# Patient Record
Sex: FEMALE | Race: WHITE | NOT HISPANIC OR LATINO | Employment: FULL TIME | URBAN - METROPOLITAN AREA
[De-identification: names, ages, dates, MRNs, and addresses within clinical notes are randomized per-mention and may not be internally consistent; named-entity substitution may affect disease eponyms.]

---

## 2017-01-17 ENCOUNTER — OFFICE VISIT (OUTPATIENT)
Dept: OBSTETRICS AND GYNECOLOGY | Facility: CLINIC | Age: 37
End: 2017-01-17

## 2017-01-17 VITALS
DIASTOLIC BLOOD PRESSURE: 64 MMHG | WEIGHT: 137.6 LBS | BODY MASS INDEX: 24.38 KG/M2 | HEIGHT: 63 IN | SYSTOLIC BLOOD PRESSURE: 110 MMHG

## 2017-01-17 DIAGNOSIS — Z01.419 GYNECOLOGIC EXAM NORMAL: Primary | ICD-10-CM

## 2017-01-17 PROCEDURE — 99395 PREV VISIT EST AGE 18-39: CPT | Performed by: NURSE PRACTITIONER

## 2017-01-17 RX ORDER — NORETHINDRONE ACETATE AND ETHINYL ESTRADIOL, ETHINYL ESTRADIOL AND FERROUS FUMARATE 1MG-10(24)
KIT ORAL
COMMUNITY
Start: 2016-12-30 | End: 2019-08-19

## 2017-01-17 NOTE — PATIENT INSTRUCTIONS
Pt. Counseled today on safe sex practices, pap smear performed, self breast examinations discussed, if positive family history mammogram starting at age 35 otherwise starting at age 40. Diet and exercise also counseled. Discussed  Spotting and stopping the pill for 1 month

## 2017-01-17 NOTE — PROGRESS NOTES
Jesusita Harris is a 36 y.o. female. Here for annual exam  Chief Complaint   Patient presents with   • Gynecologic Exam     annual, spotting      HPI:states she has been spotting on lo/loestrin for past several months    The following portions of the patient's history were reviewed and updated as appropriate: allergies, current medications, past family history, past medical history, past social history, past surgical history and problem list.    Review of Systems  Review of Systems   Constitutional: Negative.  Negative for unexpected weight change.   Respiratory: Negative for chest tightness and shortness of breath.    Cardiovascular: Negative for chest pain and palpitations.   Gastrointestinal: Negative for abdominal pain and blood in stool.   Endocrine: Negative.    Genitourinary: Positive for vaginal bleeding. Negative for dyspareunia, dysuria, frequency, hematuria, menstrual problem, pelvic pain, vaginal discharge and vaginal pain.   Musculoskeletal: Negative for joint swelling.   Skin: Negative for color change, rash and wound.   Allergic/Immunologic: Negative.    Psychiatric/Behavioral: Negative.    All other systems reviewed and are negative.      Objective   Physical Exam   Constitutional: She is oriented to person, place, and time. She appears well-developed and well-nourished.   HENT:   Head: Normocephalic.   Neck: Normal range of motion.   Cardiovascular: Normal rate and regular rhythm.    Pulmonary/Chest: Effort normal and breath sounds normal. Right breast exhibits no mass and no nipple discharge. Left breast exhibits no mass and no nipple discharge. There is no breast swelling.   Breasts soft without palpable masses   Abdominal: Soft. Bowel sounds are normal.   Genitourinary: Vagina normal and uterus normal. There is no rash or lesion on the right labia. There is no rash or lesion on the left labia. Cervix exhibits no friability. Right adnexum displays no mass, no tenderness and no fullness. Left  adnexum displays no mass, no tenderness and no fullness.   Genitourinary Comments: Ovaries  Within normal limits. Cervix  Within normal limits   Neurological: She is alert and oriented to person, place, and time.   Skin: Skin is warm and dry.   Psychiatric: She has a normal mood and affect. Her behavior is normal.   Vitals reviewed.      Assessment/Plan   Patient Instructions   Pt. Counseled today on safe sex practices, pap smear performed, self breast examinations discussed, if positive family history mammogram starting at age 35 otherwise starting at age 40. Diet and exercise also counseled. Discussed  Spotting and stopping the pill for 1 month      Jesusita was seen today for gynecologic exam.    Diagnoses and all orders for this visit:    Gynecologic exam normal  -     Pap IG, Rfx HPV ASCU        Return in about 1 year (around 1/17/2018).

## 2017-01-17 NOTE — MR AVS SNAPSHOT
"                        Jesusita Harris   1/17/2017 8:45 AM   Office Visit    Dept Phone:  [phone]   Encounter #:  [id number]    Provider:  NAYA Quintanilla   Department:  [facility]                Your Full Care Plan              Your Updated Medication List          This list is accurate as of: 1/17/17  9:38 AM.  Always use your most recent med list.                LO LOESTRIN FE 1 MG-10 MCG / 10 MCG tablet   Generic drug:  Norethin-Eth Estrad-Fe Biphas               We Performed the Following     Pap IG, Rfx HPV ASCU       You Were Diagnosed With        Codes Comments    Gynecologic exam normal    -  Primary ICD-10-CM: Z01.419  ICD-9-CM: V72.31       Instructions    Pt. Counseled today on safe sex practices, pap smear performed, self breast examinations discussed, if positive family history mammogram starting at age 35 otherwise starting at age 40. Diet and exercise also counseled. Discussed  Spotting and stopping the pill for 1 month     Patient Instructions History      Upcoming Appointments     Visit Type Date Time Department    WELLNESS 1/17/2017  8:45 AM STACY VASQUEZ [place]      [employer] Signup     Our records indicate that you have declined [employer] signup. If you would like to sign up for [employer], please email [email or web address] or call [phone] to obtain an activation code.             Other Info from Your Visit           Allergies     Penicillins        Reason for Visit     Gynecologic Exam annual, spotting      Vital Signs     Blood Pressure Height Weight Breastfeeding? Body Mass Index Smoking Status    110/64 63\" (160 cm) 137 lb 9.6 oz (62.4 kg) No 24.37 kg/m2 Never Smoker      Problems and Diagnoses Noted     Normal pelvic exam    -  Primary        "

## 2017-01-19 LAB
CONV .: NORMAL
CYTOLOGIST CVX/VAG CYTO: NORMAL
CYTOLOGY CVX/VAG DOC THIN PREP: NORMAL
DX ICD CODE: NORMAL
HIV 1 & 2 AB SER-IMP: NORMAL
OTHER STN SPEC: NORMAL
PATH REPORT.FINAL DX SPEC: NORMAL
STAT OF ADQ CVX/VAG CYTO-IMP: NORMAL

## 2018-02-21 ENCOUNTER — OFFICE VISIT (OUTPATIENT)
Dept: OBSTETRICS AND GYNECOLOGY | Facility: CLINIC | Age: 38
End: 2018-02-21

## 2018-02-21 VITALS
DIASTOLIC BLOOD PRESSURE: 62 MMHG | SYSTOLIC BLOOD PRESSURE: 114 MMHG | HEIGHT: 55 IN | WEIGHT: 134 LBS | BODY MASS INDEX: 31.01 KG/M2

## 2018-02-21 DIAGNOSIS — Z01.419 GYNECOLOGIC EXAM NORMAL: Primary | ICD-10-CM

## 2018-02-21 PROCEDURE — 99395 PREV VISIT EST AGE 18-39: CPT | Performed by: NURSE PRACTITIONER

## 2018-02-21 RX ORDER — DROSPIRENONE AND ETHINYL ESTRADIOL 0.02-3(28)
1 KIT ORAL DAILY
Qty: 28 TABLET | Refills: 1 | Status: SHIPPED | OUTPATIENT
Start: 2018-02-21 | End: 2018-10-05 | Stop reason: SDUPTHER

## 2018-02-21 NOTE — PROGRESS NOTES
Jesusita Harris is a 37 y.o. female.   Chief Complaint   Patient presents with   • Gynecologic Exam     Patient is here for a annual.      HPI:pt here for gyn exam, c/o acne    The following portions of the patient's history were reviewed and updated as appropriate: allergies, current medications, past family history, past medical history, past social history, past surgical history and problem list.    Review of Systems  Review of Systems   Constitutional: Negative.  Negative for unexpected weight change.   Respiratory: Negative for chest tightness and shortness of breath.    Cardiovascular: Negative for chest pain and palpitations.   Gastrointestinal: Negative for abdominal pain and blood in stool.   Endocrine: Negative.    Genitourinary: Negative for dyspareunia, dysuria, frequency, hematuria, menstrual problem, pelvic pain, vaginal bleeding, vaginal discharge and vaginal pain.   Musculoskeletal: Negative for joint swelling.   Skin: Negative for color change, rash and wound.   Allergic/Immunologic: Negative.    Psychiatric/Behavioral: Negative.    All other systems reviewed and are negative.      Objective   Physical Exam   Constitutional: She is oriented to person, place, and time. She appears well-developed and well-nourished.   HENT:   Head: Normocephalic.   Neck: Normal range of motion.   Cardiovascular: Normal rate and regular rhythm.    Pulmonary/Chest: Effort normal and breath sounds normal. Right breast exhibits no mass and no nipple discharge. Left breast exhibits no mass and no nipple discharge. There is no breast swelling.   Breasts soft without palpable masses breast are dense   Abdominal: Soft. Bowel sounds are normal.   Genitourinary: Vagina normal and uterus normal. There is no rash or lesion on the right labia. There is no rash or lesion on the left labia. Cervix exhibits no friability. Right adnexum displays no mass, no tenderness and no fullness. Left adnexum displays no mass, no tenderness and  no fullness.   Genitourinary Comments: Ovaries  Within normal limits. Cervix  Within normal limits   Neurological: She is alert and oriented to person, place, and time.   Skin: Skin is warm and dry.   Psychiatric: She has a normal mood and affect. Her behavior is normal.   Vitals reviewed.      Assessment/Plan   Patient Instructions   Pt. Counseled today on safe sex practices, pap smear performed, self breast examinations discussed, if positive family history mammogram starting at age 35 otherwise starting at age 40. Diet and exercise also counseled.       Jesusita was seen today for gynecologic exam.    Diagnoses and all orders for this visit:    Gynecologic exam normal  -     Pap IG, Rfx HPV ASCU    Other orders  -     drospirenone-ethinyl estradiol (MIRANDA) 3-0.02 MG per tablet; Take 1 tablet by mouth Daily.        Return in about 1 year (around 2/21/2019).

## 2018-10-05 RX ORDER — DROSPIRENONE AND ETHINYL ESTRADIOL TABLETS 0.02-3(28)
1 KIT ORAL DAILY
Qty: 28 TABLET | Refills: 0 | Status: SHIPPED | OUTPATIENT
Start: 2018-10-05 | End: 2018-11-01 | Stop reason: SDUPTHER

## 2018-11-05 RX ORDER — DROSPIRENONE AND ETHINYL ESTRADIOL TABLETS 0.02-3(28)
1 KIT ORAL DAILY
Qty: 28 TABLET | Refills: 0 | Status: SHIPPED | OUTPATIENT
Start: 2018-11-05 | End: 2019-08-19

## 2019-07-23 ENCOUNTER — OFFICE VISIT (OUTPATIENT)
Dept: FAMILY MEDICINE CLINIC | Facility: CLINIC | Age: 39
End: 2019-07-23

## 2019-07-23 VITALS
TEMPERATURE: 98.6 F | HEIGHT: 55 IN | SYSTOLIC BLOOD PRESSURE: 120 MMHG | RESPIRATION RATE: 16 BRPM | WEIGHT: 131 LBS | HEART RATE: 55 BPM | BODY MASS INDEX: 30.32 KG/M2 | OXYGEN SATURATION: 100 % | DIASTOLIC BLOOD PRESSURE: 80 MMHG

## 2019-07-23 DIAGNOSIS — Z00.00 WELLNESS EXAMINATION: Primary | ICD-10-CM

## 2019-07-23 PROCEDURE — 99395 PREV VISIT EST AGE 18-39: CPT | Performed by: NURSE PRACTITIONER

## 2019-07-23 NOTE — PROGRESS NOTES
Subjective   Jesusita Harris is a 39 y.o. female.     History of Present Illness   Jesusita Harris 39 y.o. female who presents today for a new patient appointment.    she has a history of There is no problem list on file for this patient.  .  she is here for a wellness check up and is here to re-establish care I reviewed the PFSH recorded today by my MA/LPN staff.   she   She has been feeling well.      Reports regular exercise of 4-5 days per week with cardio and weight training.    Reports hydrating well.      Reports regular menstrual periods. LMP was last week.  Reports lighter flow than she used to have.     Reports being UTD on dental exam. She has appt every 6 months.  Reports eye exam 2 years ago.     The following portions of the patient's history were reviewed and updated as appropriate: allergies, current medications, past family history, past medical history, past social history, past surgical history and problem list.    Review of Systems   Constitutional: Negative for activity change, appetite change, fatigue and unexpected weight change.   HENT: Negative for congestion.    Respiratory: Negative for shortness of breath.    Cardiovascular: Negative for chest pain and palpitations.   Gastrointestinal: Negative for abdominal pain and constipation.   Endocrine: Negative for cold intolerance, heat intolerance, polydipsia, polyphagia and polyuria.   Genitourinary: Negative for difficulty urinating, dysuria and menstrual problem.   Musculoskeletal: Negative for arthralgias.   Skin: Negative for rash.   Allergic/Immunologic: Negative for immunocompromised state.   Neurological: Negative for headaches.   Hematological: Negative for adenopathy. Does not bruise/bleed easily.   Psychiatric/Behavioral: Negative for behavioral problems, dysphoric mood and sleep disturbance. The patient is not nervous/anxious.        Objective   Physical Exam   Constitutional: She is oriented to person, place, and time. She appears  well-developed and well-nourished.   Neck: Carotid bruit is not present. No thyromegaly present.   Cardiovascular: Normal rate and regular rhythm.   Pulmonary/Chest: Effort normal and breath sounds normal.   Neurological: She is alert and oriented to person, place, and time.   Psychiatric: She has a normal mood and affect. Judgment normal.   Nursing note and vitals reviewed.      Assessment/Plan   Jesusita was seen today for annual exam.    Diagnoses and all orders for this visit:    Wellness examination  -     Comprehensive metabolic panel  -     Lipid panel  -     CBC and Differential  -     TSH

## 2019-08-13 ENCOUNTER — TELEPHONE (OUTPATIENT)
Dept: FAMILY MEDICINE CLINIC | Facility: CLINIC | Age: 39
End: 2019-08-13

## 2019-08-19 ENCOUNTER — OFFICE VISIT (OUTPATIENT)
Dept: FAMILY MEDICINE CLINIC | Facility: CLINIC | Age: 39
End: 2019-08-19

## 2019-08-19 VITALS
BODY MASS INDEX: 23.21 KG/M2 | HEIGHT: 63 IN | SYSTOLIC BLOOD PRESSURE: 140 MMHG | WEIGHT: 131 LBS | HEART RATE: 66 BPM | RESPIRATION RATE: 16 BRPM | TEMPERATURE: 97.9 F | DIASTOLIC BLOOD PRESSURE: 82 MMHG | OXYGEN SATURATION: 100 %

## 2019-08-19 DIAGNOSIS — Z12.4 PAP SMEAR FOR CERVICAL CANCER SCREENING: Primary | ICD-10-CM

## 2019-08-19 PROCEDURE — 99395 PREV VISIT EST AGE 18-39: CPT | Performed by: NURSE PRACTITIONER

## 2019-08-19 NOTE — PROGRESS NOTES
Subjective   Jesusita Harris is a 39 y.o. female.     History of Present Illness   Jesusita Harris 39 y.o. female  who presents for their yearly GYN exam. Periods are regular every 28-30 days, lasting a few days. Dysmenorrhea:mild, occurring premenstrually. Cyclic symptoms include cramping. No intermenstrual bleeding, spotting, or discharge.  She reports not having additional complaints.    Current contraception:  no method at present time  History of abnormal Pap smear: yes - 15 years ago  Family history of uterine or ovarian cancer: no  Family history of colon cancer: no  History of abnormal mammogram: no  Family history of breast cancer: no    Patient declines STD screening.  The following portions of the patient's history were reviewed and updated as appropriate: allergies, current medications, past family history, past medical history, past social history, past surgical history and problem list.    Review of Systems   Constitutional: Negative for unexpected weight change.   Respiratory: Negative for shortness of breath.    Cardiovascular: Negative for chest pain and palpitations.   Gastrointestinal: Negative for abdominal pain.   Genitourinary: Negative for decreased urine volume, difficulty urinating, dyspareunia, dysuria, enuresis, flank pain, frequency, genital sores, hematuria, menstrual problem, pelvic pain, urgency, vaginal bleeding, vaginal discharge and vaginal pain.   Musculoskeletal: Negative for back pain.   Psychiatric/Behavioral: Negative for behavioral problems.       Objective   Physical Exam   Constitutional: She is oriented to person, place, and time. She appears well-developed and well-nourished.   HENT:   Head: Normocephalic and atraumatic.   Pulmonary/Chest: Effort normal. Right breast exhibits no inverted nipple, no mass, no nipple discharge, no skin change and no tenderness. Left breast exhibits no inverted nipple, no mass, no nipple discharge, no skin change and no tenderness. Breasts are  symmetrical. There is no breast swelling.   Genitourinary: Vagina normal and uterus normal. No breast tenderness, discharge or bleeding. Pelvic exam was performed with patient supine. There is no rash, tenderness, lesion or injury on the right labia. There is no rash, tenderness, lesion or injury on the left labia. Cervix exhibits no motion tenderness, no discharge and no friability. Right adnexum displays no mass, no tenderness and no fullness. Left adnexum displays no mass, no tenderness and no fullness.   Neurological: She is alert and oriented to person, place, and time.   Skin: Skin is warm and dry.   Psychiatric: She has a normal mood and affect. Judgment normal.   Nursing note and vitals reviewed.      Assessment/Plan   Jesusita was seen today for gynecologic exam.    Diagnoses and all orders for this visit:    Pap smear for cervical cancer screening  -     PapIG, HPV, Rfx 16 / 18

## 2019-08-21 ENCOUNTER — OFFICE VISIT (OUTPATIENT)
Dept: FAMILY MEDICINE CLINIC | Facility: CLINIC | Age: 39
End: 2019-08-21

## 2019-08-21 VITALS
HEIGHT: 63 IN | SYSTOLIC BLOOD PRESSURE: 110 MMHG | HEART RATE: 66 BPM | DIASTOLIC BLOOD PRESSURE: 68 MMHG | OXYGEN SATURATION: 99 % | WEIGHT: 131 LBS | BODY MASS INDEX: 23.21 KG/M2 | RESPIRATION RATE: 16 BRPM

## 2019-08-21 DIAGNOSIS — G56.02 CARPAL TUNNEL SYNDROME, LEFT: ICD-10-CM

## 2019-08-21 DIAGNOSIS — S76.311A STRAIN OF RIGHT HAMSTRING, INITIAL ENCOUNTER: Primary | ICD-10-CM

## 2019-08-21 DIAGNOSIS — R21 SKIN RASH: ICD-10-CM

## 2019-08-21 PROCEDURE — 99214 OFFICE O/P EST MOD 30 MIN: CPT | Performed by: NURSE PRACTITIONER

## 2019-08-21 RX ORDER — CLOTRIMAZOLE AND BETAMETHASONE DIPROPIONATE 10; .64 MG/G; MG/G
CREAM TOPICAL 2 TIMES DAILY
Qty: 45 G | Refills: 0 | Status: SHIPPED | OUTPATIENT
Start: 2019-08-21 | End: 2020-11-10

## 2019-08-21 RX ORDER — METHYLPREDNISOLONE 4 MG/1
TABLET ORAL
Qty: 21 TABLET | Refills: 0 | Status: SHIPPED | OUTPATIENT
Start: 2019-08-21 | End: 2020-01-16

## 2019-08-21 RX ORDER — NAPROXEN 500 MG/1
500 TABLET ORAL 2 TIMES DAILY WITH MEALS
Qty: 30 TABLET | Refills: 0 | Status: SHIPPED | OUTPATIENT
Start: 2019-08-21 | End: 2020-01-16

## 2019-08-21 NOTE — PROGRESS NOTES
"Subjective   Jesusita Harris is a 39 y.o. female.     History of Present Illness   Patient complains of right knee pain. The symptoms began 6 weeks ago.  Reports she heard a \"pop\" when playing volleyball 6 weeks ago. Pain is located lateral and posterior region. Discomfort is described as sharp. Symptoms are exacerbated by extension.  Evaluation to date: none. Therapy to date includes: rest and ice.  Patient states she has been continuing to exercise.  Pain has improved but is not resolved. Denies swelling.    Also reports left hand pain for several days that starts in her palm and radiates to 1st through 4th fingers.  Reports aching pain. Denies weakness.       Reports small rash to right sacral area for weeks.  Has not been applying topical treatment.  Denies pain or itching.  Has hx of eczema.         The following portions of the patient's history were reviewed and updated as appropriate: allergies, current medications, past family history, past medical history, past social history, past surgical history and problem list.    Review of Systems   Constitutional: Negative for chills and fever.   Musculoskeletal: Positive for arthralgias. Negative for back pain, gait problem and joint swelling.   Skin: Positive for rash.   Neurological: Negative for weakness and numbness.       Objective   Physical Exam   Constitutional: She is oriented to person, place, and time. She appears well-developed and well-nourished.   Pulmonary/Chest: Effort normal.   Musculoskeletal:        Right knee: She exhibits normal range of motion, no swelling, no effusion, no ecchymosis, no deformity, no laceration, no erythema, normal alignment, no LCL laxity, normal patellar mobility, no bony tenderness, normal meniscus and no MCL laxity. No tenderness found. No medial joint line, no lateral joint line, no MCL, no LCL and no patellar tendon tenderness noted.   Joint stable. No pain elicited with ROM or palpation    Neurological: She is alert and " oriented to person, place, and time.   Skin: There is erythema.        1 cm erythematous scaly patch to right sacral area.    Psychiatric: She has a normal mood and affect. Judgment normal.   Nursing note and vitals reviewed.      Assessment/Plan   Jesusita was seen today for knee pain.    Diagnoses and all orders for this visit:    Strain of right hamstring, initial encounter  -     methylPREDNISolone (MEDROL, CHERRY,) 4 MG tablet; Take as directed on package instructions.  -     naproxen (NAPROSYN) 500 MG tablet; Take 1 tablet by mouth 2 (Two) Times a Day With Meals.    Carpal tunnel syndrome, left  -     methylPREDNISolone (MEDROL, CHERRY,) 4 MG tablet; Take as directed on package instructions.  -     naproxen (NAPROSYN) 500 MG tablet; Take 1 tablet by mouth 2 (Two) Times a Day With Meals.    Skin rash  -     clotrimazole-betamethasone (LOTRISONE) 1-0.05 % cream; Apply  topically to the appropriate area as directed 2 (Two) Times a Day.

## 2019-08-22 LAB
CYTOLOGIST CVX/VAG CYTO: NORMAL
CYTOLOGY CVX/VAG DOC CYTO: NORMAL
CYTOLOGY CVX/VAG DOC THIN PREP: NORMAL
DX ICD CODE: NORMAL
HIV 1 & 2 AB SER-IMP: NORMAL
HPV I/H RISK 1 DNA CVX QL PROBE+SIG AMP: NEGATIVE
Lab: NORMAL
OTHER STN SPEC: NORMAL
STAT OF ADQ CVX/VAG CYTO-IMP: NORMAL

## 2020-01-16 ENCOUNTER — OFFICE VISIT (OUTPATIENT)
Dept: FAMILY MEDICINE CLINIC | Facility: CLINIC | Age: 40
End: 2020-01-16

## 2020-01-16 VITALS
HEIGHT: 63 IN | SYSTOLIC BLOOD PRESSURE: 100 MMHG | BODY MASS INDEX: 24.1 KG/M2 | WEIGHT: 136 LBS | DIASTOLIC BLOOD PRESSURE: 60 MMHG | RESPIRATION RATE: 16 BRPM | HEART RATE: 64 BPM | OXYGEN SATURATION: 97 %

## 2020-01-16 DIAGNOSIS — Z30.011 INITIATION OF OCP (BCP): Primary | ICD-10-CM

## 2020-01-16 DIAGNOSIS — Z12.31 ENCOUNTER FOR SCREENING MAMMOGRAM FOR BREAST CANCER: ICD-10-CM

## 2020-01-16 PROCEDURE — 99213 OFFICE O/P EST LOW 20 MIN: CPT | Performed by: NURSE PRACTITIONER

## 2020-01-16 RX ORDER — LEVONORGESTREL AND ETHINYL ESTRADIOL 0.15-0.03
1 KIT ORAL DAILY
Qty: 84 TABLET | Refills: 3 | Status: SHIPPED | OUTPATIENT
Start: 2020-01-16 | End: 2020-01-16

## 2020-01-16 RX ORDER — LEVONORGESTREL / ETHINYL ESTRADIOL AND ETHINYL ESTRADIOL 150-30(84)
1 KIT ORAL DAILY
Qty: 84 EACH | Refills: 3 | Status: SHIPPED | OUTPATIENT
Start: 2020-01-16 | End: 2020-11-10

## 2020-01-16 NOTE — PATIENT INSTRUCTIONS
Oral Contraception Use  Oral contraceptive pills (OCPs) are medicines that you take to prevent pregnancy. OCPs work by:  · Preventing the ovaries from releasing eggs.  · Thickening mucus in the lower part of the uterus (cervix), which prevents sperm from entering the uterus.  · Thinning the lining of the uterus (endometrium), which prevents a fertilized egg from attaching to the endometrium.  OCPs are highly effective when taken exactly as prescribed. However, OCPs do not prevent sexually transmitted infections (STIs). Safe sex practices, such as using condoms while on an OCP, can help prevent STIs.  Before taking OCPs, you may have a physical exam, blood test, and Pap test. A Pap test involves taking a sample of cells from your cervix to check for cancer. Discuss with your health care provider the possible side effects of the OCP you may be prescribed. When you start an OCP, be aware that it can take 2-3 months for your body to adjust to changes in hormone levels.  How to take oral contraceptive pills  Follow instructions from your health care provider about how to start taking your first cycle of OCPs. Your health care provider may recommend that you:  · Start the pill on day 1 of your menstrual period. If you start at this time, you will not need any backup form of birth control (contraception), such as condoms.  · Start the pill on the first Sunday after your menstrual period or on the day you get your prescription. In these cases, you will need to use backup contraception for the first week.  · Start the pill at any time of your cycle.  ? If you take the pill within 5 days of the start of your period, you will not need a backup form of contraception.  ? If you start at any other time of your menstrual cycle, you will need to use another form of contraception for 7 days. If your OCP is the type called a minipill, it will protect you from pregnancy after taking it for 2 days (48 hours), and you can stop using  backup contraception after that time.  After you have started taking OCPs:  · If you forget to take 1 pill, take it as soon as you remember. Take the next pill at the regular time.  · If you miss 2 or more pills, call your health care provider. Different pills have different instructions for missed doses. Use backup birth control until your next menstrual period starts.  · If you use a 28-day pack that contains inactive pills and you miss 1 of the last 7 pills (pills with no hormones), throw away the rest of the non-hormone pills and start a new pill pack.  No matter which day you start the OCP, you will always start a new pack on that same day of the week. Have an extra pack of OCPs and a backup contraceptive method available in case you miss some pills or lose your OCP pack.  Follow these instructions at home:  · Do not use any products that contain nicotine or tobacco, such as cigarettes and e-cigarettes. If you need help quitting, ask your health care provider.  · Always use a condom to protect against STIs. OCPs do not protect against STIs.  · Use a calendar to radha the days of your menstrual period.  · Read the information and directions that came with your OCP. Talk to your health care provider if you have questions.  Contact a health care provider if:  · You develop nausea and vomiting.  · You have abnormal vaginal discharge or bleeding.  · You develop a rash.  · You miss your menstrual period. Depending on the type of OCP you are taking, this may be a sign of pregnancy. Ask your health care provider for more information.  · You are losing your hair.  · You need treatment for mood swings or depression.  · You get dizzy when taking the OCP.  · You develop acne after taking the OCP.  · You become pregnant or think you may be pregnant.  · You have diarrhea, constipation, and abdominal pain or cramps.  · You miss 2 or more pills.  Get help right away if:  · You develop chest pain.  · You develop shortness of  breath.  · You have an uncontrolled or severe headache.  · You develop numbness or slurred speech.  · You develop visual or speech problems.  · You develop pain, redness, and swelling in your legs.  · You develop weakness or numbness in your arms or legs.  Summary  · Oral contraceptive pills (OCPs) are medicines that you take to prevent pregnancy.  · OCPs do not prevent sexually transmitted infections (STIs). Always use a condom to protect against STIs.  · When you start an OCP, be aware that it can take 2-3 months for your body to adjust to changes in hormone levels.  · Read all the information and directions that come with your OCP.  This information is not intended to replace advice given to you by your health care provider. Make sure you discuss any questions you have with your health care provider.  Document Released: 12/06/2012 Document Revised: 01/29/2018 Document Reviewed: 01/29/2018  [employer] Patient Education © 2019 [employer]

## 2020-01-16 NOTE — PROGRESS NOTES
Subjective   Jesusita Harris is a 39 y.o. female.     History of Present Illness   Patient presents to office to discuss mammogram and OCP.  Patient has been on oral contraceptives in the past.  She took ortho-tri cyclen lo which gave her headaches and she took Jessica which she did well with.  Mirena discussed during visit, but patient would like to start with OCP for now.  She is 39 but does not smoke, denies personal or FH of breast cancer, blood clots, clotting disorder.    She has never had mammogram and will be 40 this year.   The following portions of the patient's history were reviewed and updated as appropriate: allergies, current medications, past family history, past medical history, past social history, past surgical history and problem list.    Review of Systems   Constitutional: Negative for unexpected weight change.   Respiratory: Negative for shortness of breath.    Cardiovascular: Negative for chest pain and palpitations.   Psychiatric/Behavioral: Negative for behavioral problems.       Objective   Physical Exam   Constitutional: She is oriented to person, place, and time. She appears well-developed and well-nourished.   Cardiovascular: Normal rate and regular rhythm.   Pulmonary/Chest: Effort normal and breath sounds normal.   Neurological: She is alert and oriented to person, place, and time.   Psychiatric: She has a normal mood and affect. Judgment normal.   Nursing note and vitals reviewed.      Assessment/Plan   Jesusita was seen today for contraception.    Diagnoses and all orders for this visit:    Initiation of OCP (BCP)  -     Levonorgest-Eth Estrad 91-Day 0.15-0.03 &0.01 MG tablet; Take 1 tablet by mouth Daily.    Encounter for screening mammogram for breast cancer  -     Mammo Screening Bilateral With CAD; Future    Other orders  -     Discontinue: levonorgestrel-ethinyl estradiol (SEASONALE) 0.15-0.03 MG per tablet; Take 1 tablet by mouth Daily.        Gave number for [facility] if patient  decides she would like to discuss IUD further.

## 2020-11-10 ENCOUNTER — OFFICE VISIT (OUTPATIENT)
Dept: OBSTETRICS AND GYNECOLOGY | Facility: CLINIC | Age: 40
End: 2020-11-10

## 2020-11-10 VITALS
HEIGHT: 63 IN | BODY MASS INDEX: 23.57 KG/M2 | SYSTOLIC BLOOD PRESSURE: 114 MMHG | DIASTOLIC BLOOD PRESSURE: 80 MMHG | WEIGHT: 133 LBS

## 2020-11-10 DIAGNOSIS — Z30.011 ENCOUNTER FOR INITIAL PRESCRIPTION OF CONTRACEPTIVE PILLS: ICD-10-CM

## 2020-11-10 DIAGNOSIS — L70.9 ACNE, UNSPECIFIED ACNE TYPE: ICD-10-CM

## 2020-11-10 DIAGNOSIS — Z01.419 WELL WOMAN EXAM WITH ROUTINE GYNECOLOGICAL EXAM: Primary | ICD-10-CM

## 2020-11-10 PROCEDURE — 99212 OFFICE O/P EST SF 10 MIN: CPT | Performed by: STUDENT IN AN ORGANIZED HEALTH CARE EDUCATION/TRAINING PROGRAM

## 2020-11-10 PROCEDURE — 99386 PREV VISIT NEW AGE 40-64: CPT | Performed by: STUDENT IN AN ORGANIZED HEALTH CARE EDUCATION/TRAINING PROGRAM

## 2020-11-10 RX ORDER — DROSPIRENONE AND ETHINYL ESTRADIOL 0.02-3(28)
1 KIT ORAL DAILY
Qty: 84 TABLET | Refills: 3 | Status: SHIPPED | OUTPATIENT
Start: 2020-11-10 | End: 2021-11-10

## 2020-11-10 NOTE — PROGRESS NOTES
GYN Annual Exam     CC- Here for annual exam.     Jesusita Harris is a 40 y.o. female who presents for annual well woman exam. Periods are regular every 22-30 days, lasting 1-4 days. Dysmenorrhea:moderate, occurring premenstrually and first 1-2 days of flow. Cyclic symptoms include none. No intermenstrual bleeding, spotting, or discharge.    She was previously taking OCPs but self discontinued in late May or early  because of side effects. She felt that she was having irregular menses with Seasonale and worsening acne. She reports that she is having worsening facial acne along her chin and mouth that started in April. She is concerned regarding hormonal changes causing this and reports that she has used OTC facial creams and soaps for acne without improvement.     OB History        0    Para   0    Term   0       0    AB   0    Living   0       SAB   0    TAB   0    Ectopic   0    Molar        Multiple   0    Live Births                  Sexually active with one male partner ()   Current contraception: none  History of abnormal Pap smear: yes - precancerous- LEEP 15 years ago. Normal paps since that time.   Family history of uterine, colon or ovarian cancer: no  History of abnormal mammogram: n/a  Family history of breast cancer: no  Last Pap : 19- NILM, negative HPV     History reviewed. No pertinent past medical history.    History reviewed. No pertinent surgical history.      Current Outpatient Medications:   •  drospirenone-ethinyl estradiol (MIRANDA) 3-0.02 MG per tablet, Take 1 tablet by mouth Daily., Disp: 84 tablet, Rfl: 3    Allergies   Allergen Reactions   • Penicillins        Social History     Tobacco Use   • Smoking status: Never Smoker   • Smokeless tobacco: Never Used   Substance Use Topics   • Alcohol use: Yes     Alcohol/week: 3.0 standard drinks     Types: 3 Glasses of wine per week     Comment: socially   • Drug use: No       Family History   Problem Relation Age of Onset  "  • Cancer Paternal Grandmother    • Diabetes Maternal Grandfather    • Stroke Maternal Grandmother        Review of Systems   Constitutional: Negative for chills and fever.   HENT: Positive for congestion. Negative for sore throat.    Eyes: Negative for photophobia and visual disturbance.   Respiratory: Negative for cough and shortness of breath.    Cardiovascular: Negative for chest pain and leg swelling.   Gastrointestinal: Negative for abdominal pain, constipation and diarrhea.   Endocrine: Negative for cold intolerance and heat intolerance.   Genitourinary: Negative for breast discharge, breast lump, breast pain, difficulty urinating, vaginal bleeding and vaginal discharge.   Musculoskeletal: Negative for arthralgias and myalgias.   Skin: Negative for rash and bruise.   Allergic/Immunologic: Positive for environmental allergies and food allergies.   Neurological: Negative for dizziness and light-headedness.   Hematological: Negative for adenopathy. Does not bruise/bleed easily.   Psychiatric/Behavioral: Negative for agitation. The patient is not nervous/anxious.        /80   Ht 160 cm (62.99\")   Wt 60.3 kg (133 lb)   LMP 10/27/2020   BMI 23.57 kg/m²     Physical Exam  Vitals signs reviewed. Exam conducted with a chaperone present.   Constitutional:       Appearance: Normal appearance. She is normal weight.   HENT:      Head: Normocephalic and atraumatic.      Right Ear: External ear normal.      Left Ear: External ear normal.   Eyes:      Extraocular Movements: Extraocular movements intact.      Pupils: Pupils are equal, round, and reactive to light.   Neck:      Musculoskeletal: Normal range of motion and neck supple.   Cardiovascular:      Rate and Rhythm: Normal rate and regular rhythm.   Pulmonary:      Effort: Pulmonary effort is normal. No respiratory distress.   Chest:      Breasts:         Right: Normal. No swelling, bleeding, inverted nipple, mass, nipple discharge, skin change or tenderness. "         Left: Normal. No swelling, bleeding, inverted nipple, mass, nipple discharge, skin change or tenderness.   Genitourinary:     General: Normal vulva.      Labia:         Right: No rash, tenderness, lesion or injury.         Left: No rash, tenderness, lesion or injury.       Urethra: No prolapse or urethral swelling.      Vagina: Normal. No vaginal discharge, erythema, tenderness, bleeding or lesions.      Cervix: Normal.      Uterus: Normal. Not enlarged, not fixed and not tender.       Adnexa: Right adnexa normal and left adnexa normal.        Right: No mass, tenderness or fullness.          Left: No mass, tenderness or fullness.     Musculoskeletal: Normal range of motion.         General: No deformity.   Lymphadenopathy:      Upper Body:      Right upper body: No supraclavicular or axillary adenopathy.      Left upper body: No supraclavicular or axillary adenopathy.      Lower Body: No right inguinal adenopathy. No left inguinal adenopathy.   Skin:     General: Skin is warm and dry.   Neurological:      General: No focal deficit present.      Mental Status: She is alert and oriented to person, place, and time.   Psychiatric:         Mood and Affect: Mood normal.         Behavior: Behavior normal.              Assessment     1) GYN annual well woman exam.   2) OCP initiation  3) Acne      Plan     1) Breast Health - Clinical breast exam yearly, Self breast awareness monthly. Recommended mammogram every 1-2 years in her 40s per ACOG guidelines. Mammogram ordered through PCP and waiting to have it performed during COVID pandemic.   2) Pap - up to date. Recommend repeat pap smear with cotesting 5 years from previous pap.   3) Smoking status- non-smoker   4) Activity recommends - Adult 150-300 min/week of multi-component physical activities that include balance training, aerobic and physical strengthening.     5) Birth control/Acne- We discussed that acne can be related to hormone changes and OCPs can be used  to improve acne. Patient wishes to reinitiate OCPs but wishes to be one that will not cause irregular bleeding like Seasonale. I reviewed previous OCP use and patient reports doing well on Jessica. Various contraceptive options discussed including natural family planning and abstinence. Paitent elects for oral contraceptive pills. Risks, benefits, alternatives discussed at length. Risks of venous thromboembolic complications discussed. Instructions for use and Sunday start discussed. Discussed condoms in first week for backup method. Also discussed condoms for STD prevention. All questions answered. Rx for Jessica given. We discussed this hopefully will help with acne control and if not recommend follow up with dermatology.   6) Follow up prn and one year.       Leslie Fisher MD  11/10/2020  21:12 EST